# Patient Record
Sex: FEMALE | Race: WHITE | Employment: UNEMPLOYED | URBAN - METROPOLITAN AREA
[De-identification: names, ages, dates, MRNs, and addresses within clinical notes are randomized per-mention and may not be internally consistent; named-entity substitution may affect disease eponyms.]

---

## 2018-10-15 ENCOUNTER — TELEPHONE (OUTPATIENT)
Dept: ORTHOPEDIC SURGERY | Facility: CLINIC | Age: 53
End: 2018-10-15

## 2018-10-15 NOTE — TELEPHONE ENCOUNTER
Spoke with Izzy and gave her Dr. Clau Turcios protocol for dental work after a total joint replacement.

## 2018-10-15 NOTE — TELEPHONE ENCOUNTER
Izzy dupree/ Dr. Merle Malhotra Dentist need to know if the protocol given 10/11 for antibiodic Duricef 500mg cap for patient is for a live time for any dental work.   Please call Izzy back at [phone]

## 2019-01-31 RX ORDER — AMOXICILLIN 500 MG/1
CAPSULE ORAL
Qty: 7 CAP | Refills: 1 | Status: SHIPPED | OUTPATIENT
Start: 2019-01-31